# Patient Record
Sex: MALE
[De-identification: names, ages, dates, MRNs, and addresses within clinical notes are randomized per-mention and may not be internally consistent; named-entity substitution may affect disease eponyms.]

---

## 2018-05-16 ENCOUNTER — HOSPITAL ENCOUNTER (INPATIENT)
Dept: HOSPITAL 88 - FSED | Age: 73
LOS: 6 days | Discharge: HOME | DRG: 573 | End: 2018-05-22
Attending: INTERNAL MEDICINE | Admitting: INTERNAL MEDICINE
Payer: MEDICARE

## 2018-05-16 VITALS — WEIGHT: 216.38 LBS | HEIGHT: 71 IN | BODY MASS INDEX: 30.29 KG/M2

## 2018-05-16 DIAGNOSIS — I12.9: ICD-10-CM

## 2018-05-16 DIAGNOSIS — Z87.891: ICD-10-CM

## 2018-05-16 DIAGNOSIS — N17.0: ICD-10-CM

## 2018-05-16 DIAGNOSIS — E87.5: ICD-10-CM

## 2018-05-16 DIAGNOSIS — E11.40: ICD-10-CM
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
## 2018-05-16 DIAGNOSIS — L03.116: Primary | ICD-10-CM

## 2018-05-16 DIAGNOSIS — Z79.4: ICD-10-CM

## 2018-05-16 DIAGNOSIS — E11.22: ICD-10-CM

## 2018-05-16 DIAGNOSIS — N18.4: ICD-10-CM

## 2018-05-16 DIAGNOSIS — Z91.19: ICD-10-CM

## 2018-05-16 PROCEDURE — 84156 ASSAY OF PROTEIN URINE: CPT

## 2018-05-16 PROCEDURE — 87075 CULTR BACTERIA EXCEPT BLOOD: CPT

## 2018-05-16 PROCEDURE — 76882 US LMTD JT/FCL EVL NVASC XTR: CPT

## 2018-05-16 PROCEDURE — 76770 US EXAM ABDO BACK WALL COMP: CPT

## 2018-05-16 PROCEDURE — 97139 UNLISTED THERAPEUTIC PX: CPT

## 2018-05-16 PROCEDURE — 99284 EMERGENCY DEPT VISIT MOD MDM: CPT

## 2018-05-16 PROCEDURE — 82948 REAGENT STRIP/BLOOD GLUCOSE: CPT

## 2018-05-16 PROCEDURE — 81001 URINALYSIS AUTO W/SCOPE: CPT

## 2018-05-16 PROCEDURE — 36415 COLL VENOUS BLD VENIPUNCTURE: CPT

## 2018-05-16 PROCEDURE — 85025 COMPLETE CBC W/AUTO DIFF WBC: CPT

## 2018-05-16 PROCEDURE — 96361 HYDRATE IV INFUSION ADD-ON: CPT

## 2018-05-16 PROCEDURE — 87071 CULTURE AEROBIC QUANT OTHER: CPT

## 2018-05-16 PROCEDURE — 87205 SMEAR GRAM STAIN: CPT

## 2018-05-16 PROCEDURE — 84550 ASSAY OF BLOOD/URIC ACID: CPT

## 2018-05-16 PROCEDURE — 80053 COMPREHEN METABOLIC PANEL: CPT

## 2018-05-16 PROCEDURE — 82570 ASSAY OF URINE CREATININE: CPT

## 2018-05-16 PROCEDURE — 83735 ASSAY OF MAGNESIUM: CPT

## 2018-05-16 PROCEDURE — 80202 ASSAY OF VANCOMYCIN: CPT

## 2018-05-17 VITALS — SYSTOLIC BLOOD PRESSURE: 138 MMHG | DIASTOLIC BLOOD PRESSURE: 79 MMHG

## 2018-05-17 VITALS — DIASTOLIC BLOOD PRESSURE: 76 MMHG | SYSTOLIC BLOOD PRESSURE: 149 MMHG

## 2018-05-17 VITALS — DIASTOLIC BLOOD PRESSURE: 62 MMHG | SYSTOLIC BLOOD PRESSURE: 121 MMHG

## 2018-05-17 VITALS — DIASTOLIC BLOOD PRESSURE: 67 MMHG | SYSTOLIC BLOOD PRESSURE: 143 MMHG

## 2018-05-17 VITALS — DIASTOLIC BLOOD PRESSURE: 67 MMHG | SYSTOLIC BLOOD PRESSURE: 141 MMHG

## 2018-05-17 VITALS — DIASTOLIC BLOOD PRESSURE: 68 MMHG | SYSTOLIC BLOOD PRESSURE: 136 MMHG

## 2018-05-17 VITALS — SYSTOLIC BLOOD PRESSURE: 149 MMHG | DIASTOLIC BLOOD PRESSURE: 76 MMHG

## 2018-05-17 LAB
ALBUMIN SERPL-MCNC: 3.5 G/DL (ref 3.5–5)
ALBUMIN/GLOB SERPL: 1.1 {RATIO} (ref 0.8–2)
ALP SERPL-CCNC: 60 IU/L (ref 40–150)
ALT SERPL-CCNC: 14 IU/L (ref 0–55)
ANION GAP SERPL CALC-SCNC: 14.7 MMOL/L (ref 8–16)
BACTERIA URNS QL MICRO: (no result) /HPF
BASOPHILS # BLD AUTO: 0 10*3/UL (ref 0–0.1)
BASOPHILS NFR BLD AUTO: 0.8 % (ref 0–1)
BILIRUB UR QL: NEGATIVE
BUN SERPL-MCNC: 43 MG/DL (ref 7–26)
BUN/CREAT SERPL: 19 (ref 6–25)
CALCIUM SERPL-MCNC: 9.1 MG/DL (ref 8.4–10.2)
CHLORIDE SERPL-SCNC: 113 MMOL/L (ref 98–107)
CLARITY UR: CLEAR
CO2 SERPL-SCNC: 15 MMOL/L (ref 22–29)
COLOR UR: YELLOW
CREAT UR-MCNC: 52.47 MG/DL (ref 63–166)
DEPRECATED NEUTROPHILS # BLD AUTO: 3 10*3/UL (ref 2.1–6.9)
DEPRECATED RBC URNS MANUAL-ACNC: (no result) /HPF (ref 0–5)
EGFRCR SERPLBLD CKD-EPI 2021: 28 ML/MIN (ref 60–?)
EOSINOPHIL # BLD AUTO: 0.2 10*3/UL (ref 0–0.4)
EOSINOPHIL NFR BLD AUTO: 3 % (ref 0–6)
EPI CELLS URNS QL MICRO: (no result) /LPF
ERYTHROCYTE [DISTWIDTH] IN CORD BLOOD: 13.4 % (ref 11.7–14.4)
GLOBULIN PLAS-MCNC: 3.2 G/DL (ref 2.3–3.5)
GLUCOSE SERPLBLD-MCNC: 164 MG/DL (ref 74–118)
HCT VFR BLD AUTO: 33.9 % (ref 38.2–49.6)
HGB BLD-MCNC: 11.3 G/DL (ref 14–18)
KETONES UR QL STRIP.AUTO: NEGATIVE
LEUKOCYTE ESTERASE UR QL STRIP.AUTO: NEGATIVE
LYMPHOCYTES # BLD: 1.1 10*3/UL (ref 1–3.2)
LYMPHOCYTES NFR BLD AUTO: 22.8 % (ref 18–39.1)
MCH RBC QN AUTO: 29.6 PG (ref 28–32)
MCHC RBC AUTO-ENTMCNC: 33.3 G/DL (ref 31–35)
MCV RBC AUTO: 88.7 FL (ref 81–99)
MONOCYTES # BLD AUTO: 0.6 10*3/UL (ref 0.2–0.8)
MONOCYTES NFR BLD AUTO: 12.2 % (ref 4.4–11.3)
NEUTS SEG NFR BLD AUTO: 60 % (ref 38.7–80)
NITRITE UR QL STRIP.AUTO: NEGATIVE
PLATELET # BLD AUTO: 156 X10E3/UL (ref 140–360)
POTASSIUM SERPL-SCNC: 5.7 MMOL/L (ref 3.5–5.1)
PROT UR QL STRIP.AUTO: (no result)
PROT UR-MCNC: 26.9 MG/DL (ref 1–14)
RBC # BLD AUTO: 3.82 X10E6/UL (ref 4.3–5.7)
SODIUM SERPL-SCNC: 137 MMOL/L (ref 136–145)
SP GR UR STRIP: 1.01 (ref 1.01–1.02)
UROBILINOGEN UR STRIP-MCNC: 0.2 MG/DL (ref 0.2–1)
WBC #/AREA URNS HPF: (no result) /HPF (ref 0–5)

## 2018-05-17 RX ADMIN — SODIUM CHLORIDE SCH MLS/HR: 9 INJECTION, SOLUTION INTRAVENOUS at 00:00

## 2018-05-17 RX ADMIN — TRAMADOL HYDROCHLORIDE PRN MG: 50 TABLET, FILM COATED ORAL at 20:42

## 2018-05-17 RX ADMIN — GLIPIZIDE SCH MG: 5 TABLET ORAL at 16:53

## 2018-05-17 RX ADMIN — SODIUM CHLORIDE SCH MLS/HR: 9 INJECTION, SOLUTION INTRAVENOUS at 02:32

## 2018-05-17 RX ADMIN — TAZOBACTAM SODIUM AND PIPERACILLIN SODIUM SCH MLS/HR: 250; 2 INJECTION, SOLUTION INTRAVENOUS at 21:56

## 2018-05-17 RX ADMIN — TAZOBACTAM SODIUM AND PIPERACILLIN SODIUM SCH MLS/HR: 250; 2 INJECTION, SOLUTION INTRAVENOUS at 14:00

## 2018-05-17 RX ADMIN — TAZOBACTAM SODIUM AND PIPERACILLIN SODIUM SCH MLS/HR: 250; 2 INJECTION, SOLUTION INTRAVENOUS at 06:08

## 2018-05-17 RX ADMIN — DEXTROSE MONOHYDRATE SCH MLS/HR: 50 INJECTION, SOLUTION INTRAVENOUS at 15:45

## 2018-05-17 NOTE — CONSULTATION
DATE OF CONSULTATION:  May 17, 2018 



ATTENDING PHYSICIAN:  Dr. Dustin Arango.



REASON FOR CONSULTATION:  Cellulitis.



Thank you, Dr. Arango, for asking me to see this patient.  



 HISTORY OF PRESENT ILLNESS:  The patient is a 73-year-old man referred for 

cellulitis.  He presented to the emergency department yesterday with left 

leg wound, which failed outpatient treatment.  The patient fell of a ladder 

at home and sustained abrasion on the left and right pretibial areas as 

well as the right wrist.  He was evaluated as an outpatient and treated 

with Bactrim.  The right leg and wrist abrasions improved, but the left leg 

wound got worse.  He was therefore teated with clindamycin without 

improvement.  He denies fever, chills, nausea, vomiting, diarrhea, 

abdominal pain, and dysuria.  The patient claimed that outpatient x-ray 

showed no foreign body or fracture.



PAST MEDICAL HISTORY:  Diabetes mellitus type 2, hypertension, and 

hyperlipidemia.



PAST SURGICAL HISTORY:  Appendectomy, left knee arthroscopic surgery for 

torn meniscus, and left ankle open reduction and internal fixation.



ALLERGIES:  NO KNOWN DRUG ALLERGIES.  



MEDICATIONS:  Current antibiotics is Zosyn 2.25 g IV piggyback q.8 hours.  

Received vancomycin 1 g IV piggyback once and cefazolin 1 g IV piggyback 

once.  



IMMUNIZATION:  He received pneumococcal vaccination within 10 years.  Also, 

he received pneumococcal vaccination in the past.



FAMILY HISTORY:  Significant for diabetes mellitus and hypertension in the 

mother.  The father had hypertension and the sister with diabetes mellitus.



SOCIAL HISTORY:  He quit smoking cigarettes at age 27.  No alcohol or 

tobacco use.



REVIEW OF SYSTEMS:  As per history of present illness.



PHYSICAL EXAMINATION

GENERAL:  No acute distress. 

VITAL SIGNS:  T-max 98.3, pulse 74, respiratory rate 20, blood pressure 

141/67.  Weight 216 pounds.

HEENT:  Normocephalic.  There is no icterus or injection of conjunctivae.  

There is no ear or nasal discharge.  Moist oral mucosa.  No pharyngeal 

erythema or exudate.

NECK:  Supple.  No lymphadenopathy.

LUNGS:  Clear to auscultation bilaterally.  

HEART:  Normal S1 and S2.  Regular.

ABDOMEN:  Soft and nontender.

EXTREMITIES:  There is a dry scab on the right pretibial area as well as 

the right wrist.  There is no surrounding erythema or redness.  There is an 

abrasion with dry scab on the left pretibial area with surrounding erythema 

and fluctuance.  There is tenderness on palpation.  There is no edema, 

clubbing or cyanosis of the rest of the extremities.  Dorsalis pedis and 

posterior tibial pulses are palpable.

SKIN:  As per extremities.  

CNS:  Awake, alert and oriented to person, place and time.  There is 

decreased sensation on monofilament examination of the feet bilaterally.  

Also, there is decreased vibration and sensation of the feet and ankle.  

Nonfocal.



LABORATORY AND DIAGNOSTICS:  WBC 4990, hemoglobin 11.3, platelets 156,000, 

neutrophils 60, lymphs 22.8, monos 12.2, eosinophils 3, and basophils 0.8.  

BUN 43, creatinine 2.29, blood glucose 164.  AST 15, ALT 14, alk phos 60, 

and total bilirubin 0.4.





IMPRESSION 

1. Cellulitis of the left lower extremity.

2. Left pretibial wound, rule out abscess.

3. 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

4. Diabetes mellitus type 2 with peripheral neuropathy, controlled.



PLAN

1. Consult surgical service for incision and drainage.

2. Check random vancomycin level.

3. Await nephrology input.









DD:  05/17/2018 11:30

DT:  05/17/2018 13:52

Job#:  [id number]

## 2018-05-17 NOTE — CONSULTATION
DATE OF CONSULTATION:  May 17, 2018 



RENAL CONSULT 



HISTORY OF PRESENT ILLNESS:  History predominantly from chart, electronic 

records.  This is a pleasant 73-year-old white  gentleman, 

underlying history of type 2 diabetes, hypertension, who has been admitted 

with an injury to his left lower extremity.  He has had prior left ankle 

surgery and hardware placement, prior history of appendectomy.  He has a 

history of prior tobacco addiction.



FAMILY HISTORY:  Strongly positive for diabetes.  Patient also claims he 

has had kidney stones in the remote past.  Many years ago he saw a 

urologist but does not remember the radiologist's name, and no recurrence 

of kidney stone has been noted.  



Renal has been consulted because of acute kidney injury.  Serum creatinine 

2.29, bicarbonate 15, potassium 5.7.  



CURRENT MEDICATIONS:  Include cefazolin, IV normal saline at 125 mL an 

hour, piperacillin/tazobactam.  Acetaminophen p.r.n., glipizide 5 mg 

b.i.d., lisinopril 10 mg daily, ondansetron p.r.n.  Also received 1 dose of 

vancomycin.



SOCIAL HISTORY:  Ex-smoker, does not smoke or drink anymore.  .



FAMILY HISTORY:  Significant for diabetes and hypertension.



ALLERGIES:  NO APPARENT DRUG ALLERGIES.



PHYSICAL EXAMINATION:   

GENERAL:  Awake, alert, lying supine.  No apparent distress. 

VITALS:  Blood pressure 136/68, pulse rate 80, afebrile, respiratory rate 

17.

HEAD AND NECK:  Cornea clear.  Oral mucosa dry.  Neck veins flat. 

LUNGS:  Relatively clear.

HEART:  S1 and S2 audible. 

ABDOMEN:  Otherwise soft, nontender.

LOWER EXTREMITY EXAMINATION:  Left lower extremity dressing noted, not 

removed for evaluation.  No edema noted. 



IMPRESSION/PLAN:  Hyperkalemia with evidence of distal renal tubular 

acidosis.  Underlying hypertension.  Acute kidney injury, etiology unclear, 

possibly due to chronic kidney disease.  Will discontinue lisinopril.  

Discontinue current IV fluid.  Will start IV bicarbonate.  Dose with 

Kayexalate lactulose.  Place on a 2-gram-potassium restricted renal diet.  

Renal workup ordered.  Please see orders. 



 





DD:  05/17/2018 14:36

DT:  05/17/2018 14:54

Job#:  [id number]

## 2018-05-17 NOTE — DIAGNOSTIC IMAGING REPORT
PROCEDURE:US RETROPERITONEAL ( KIDNEY ).

COMPARISON:None.

INDICATIONS:GAGAN

TECHNIQUE: Grey-scale and color sonographic images of the bilateral 

kidneys and bladder where obtained in transverse and longitudinal 

planes.

 

FINDINGS:

 

RIGHT KIDNEY: 10.7 cm, cortex 1.6 cm

Cysts: None

Solid masses: None

Stones: None

Hydronephrosis: None

Echogenicity: Normal

 

LEFT KIDNEY: 13.3 cm, cortex 2.0 cm

Cysts: None

Solid masses: None

Stones: None

Hydronephrosis: None

Echogenicity: Normal

 

Bladder: Unremarkable.

Prostate: The prostate gland measures 7.5 x 2.0 x 5.0 cm (55.1 mL).

 

CONCLUSION:

Enlarged prostate gland.

 

No hydronephrosis, stones, or solid renal masses. 

 

Dictated by:  Tonny Medina M.D. on 5/17/2018 at 16:12     

Electronically approved by:  Tonny Medina M.D. on 5/17/2018 at 16:12

## 2018-05-17 NOTE — HISTORY AND PHYSICAL
He is a 73-year-old male patient of mine with a history of diabetes 

mellitus he was told because of noncompliance.  Had a fall off a ladder 2 

weeks ago, and he stabbed his left lower anterior tibia.  The patient was 

seen in the office.  The patient was started on Bactrim and Mupirocin 

cream.  The patient had no significant improvement so clindamycin was added 

and Mupirocin was changed to Povidine Iodine dressing because of the local 

infection.  That was done 2 days ago.  Apparently, the patient did not 

improve.  Then because of the pain came to the emergency room at the 

[facility].  Subsequently, the patient was found to have a high 

creatinine of 2.7.  The patient was admitted.  



PAST MEDICAL HISTORY:  Diabetes mellitus, hypertension.



PAST SURGICAL HISTORY:  Left ankle surgery and hardware in place, 

appendectomy.



REVIEW OF SYSTEMS:  Left lower leg pain.



SOCIAL HISTORY:  Former smoker.  Denies using alcohol.



FAMILY HISTORY:  Diabetes mellitus. 



MEDICATIONS:  The patient was on glimepiride, metformin, Azactam, and 

clinda.



ALLERGIES:  NO KNOWN DRUG ALLERGIES. 



PHYSICAL EXAMINATION 

GENERAL:  He is an elderly male patient lying in bed.

VITALS:  Temperature 97, pulse rate 74, blood pressure 140/70, respirations 

20.

HEENT:  Normocephalic and atraumatic.  No JVD.  No adenopathy.

LUNGS:  Bilateral equal fair air entry.  No rales.  No rhonchi.

HEART:  S1 and S2 regular.  No murmurs.  No gallops.

ABDOMEN:  Soft.  Bowel sounds are present.  

NEUROLOGICAL:  No focal neurological deficit. 



EXTREMITIES:  On the left lower extremity, the patient has leg ulcer with 

eschar, surrounding erythema and tenderness.  The area is around 5 cm.  



LABORATORY EXAMINATION:  The patient's creatinine was 3.1.  Repeat was 2.7, 

potassium 5.3.  CBC normal.  X-ray of the left tibia and fibula negative.  



IMPRESSION AND DIAGNOSES 

1.  Left lower leg cellulitis.

2.  Acute renal failure.

3.  Diabetes mellitus. 

4.  Left lower leg traumatic ulcer, which is infected.



PLAN:  The patient will be admitted with the above diagnoses.  Will give 

the patient IV fluids and stop Bactrim, clindamycin and metformin for the 

renal failure.  Give the patient IV Zosyn and dose of vancomycin.  Will 

obtain wound culture and get wound care.  Will obtain ID and nephrology 

consultation.  









DD:  05/17/2018 09:34

DT:  05/17/2018 10:11

Job#:  [id number]

## 2018-05-17 NOTE — CONSULTATION
DATE OF CONSULTATION:  May 17, 2018 



CHIEF COMPLAINT:  Left leg abrasion.  



HISTORY OF PRESENT ILLNESS:  The patient is a 73-year-old male with history 

of hypertension and diabetes who fell on a ladder 2 weeks ago with 

abrasions on the left lower extremity which has not improved despite 

treatment with antibiotics.  He has some burning pain in the area, but 

denies fever or chills.  



PAST MEDICAL HISTORY:  Significant as mentioned for diabetes and 

hypertension.



PAST SURGICAL HISTORY:  Positive for ankle of surgery hardware and 

appendectomy.  



ALLERGIES:  NO KNOWN DRUG ALLERGIES.  



SOCIAL HABITS:  The patient is a nonsmoker and non-alcohol user.



REVIEW OF SYSTEMS:  No chest pain.  No shortness of breath.   

 

PHYSICAL EXAMINATION:  

VITALS:   Signs are stable.  He is afebrile . 

GENERAL:  He is awake, alert and in no apparent distress. 

HEENT:  Sclerae anicteric. 

NECK:  Supple. 

LUNGS:  Clear. 

HEART:  Is regular rate and rhythm. 

ABDOMEN:  Soft.  Nontender. 

EXTREMITIES:  Revealed an area approximately 4 x 3 cm on the anterior 

aspect of the left lower extremity overlying the tibia with indurations and 

tenderness to palpation with a central skin necrosis.  No purulent 

drainage.  Pedal pulse present.  

White cell count is 4.9, hemoglobin 11.  Creatinine is 2.3. 



ASSESSMENT:  Left lower extremity, localized soft tissue necrosis and 

infection.



PLAN:  Recommend needle aspiration to ascertain that there is no purulent 

collection; however, the patient states he has been aspirated with nothing 

found.  Will order an ultrasound of the area.  Will follow the patient with 

you.  

 





DD:  05/17/2018 19:00

DT:  05/17/2018 19:33

Job#:  [id number]

## 2018-05-18 VITALS — DIASTOLIC BLOOD PRESSURE: 59 MMHG | SYSTOLIC BLOOD PRESSURE: 102 MMHG

## 2018-05-18 VITALS — SYSTOLIC BLOOD PRESSURE: 102 MMHG | DIASTOLIC BLOOD PRESSURE: 59 MMHG

## 2018-05-18 VITALS — SYSTOLIC BLOOD PRESSURE: 102 MMHG | DIASTOLIC BLOOD PRESSURE: 54 MMHG

## 2018-05-18 VITALS — DIASTOLIC BLOOD PRESSURE: 69 MMHG | SYSTOLIC BLOOD PRESSURE: 138 MMHG

## 2018-05-18 VITALS — SYSTOLIC BLOOD PRESSURE: 110 MMHG | DIASTOLIC BLOOD PRESSURE: 57 MMHG

## 2018-05-18 VITALS — SYSTOLIC BLOOD PRESSURE: 126 MMHG | DIASTOLIC BLOOD PRESSURE: 68 MMHG

## 2018-05-18 VITALS — SYSTOLIC BLOOD PRESSURE: 114 MMHG | DIASTOLIC BLOOD PRESSURE: 63 MMHG

## 2018-05-18 LAB
ALBUMIN SERPL-MCNC: 3.8 G/DL (ref 3.5–5)
ALBUMIN/GLOB SERPL: 1.1 {RATIO} (ref 0.8–2)
ALP SERPL-CCNC: 63 IU/L (ref 40–150)
ALT SERPL-CCNC: 15 IU/L (ref 0–55)
ANION GAP SERPL CALC-SCNC: 13.6 MMOL/L (ref 8–16)
BASOPHILS # BLD AUTO: 0.1 10*3/UL (ref 0–0.1)
BASOPHILS NFR BLD AUTO: 0.8 % (ref 0–1)
BUN SERPL-MCNC: 37 MG/DL (ref 7–26)
BUN/CREAT SERPL: 18 (ref 6–25)
CALCIUM SERPL-MCNC: 9.2 MG/DL (ref 8.4–10.2)
CHLORIDE SERPL-SCNC: 113 MMOL/L (ref 98–107)
CO2 SERPL-SCNC: 17 MMOL/L (ref 22–29)
DEPRECATED NEUTROPHILS # BLD AUTO: 4 10*3/UL (ref 2.1–6.9)
EGFRCR SERPLBLD CKD-EPI 2021: 32 ML/MIN (ref 60–?)
EOSINOPHIL # BLD AUTO: 0.2 10*3/UL (ref 0–0.4)
EOSINOPHIL NFR BLD AUTO: 3.1 % (ref 0–6)
ERYTHROCYTE [DISTWIDTH] IN CORD BLOOD: 13.5 % (ref 11.7–14.4)
GLOBULIN PLAS-MCNC: 3.6 G/DL (ref 2.3–3.5)
GLUCOSE SERPLBLD-MCNC: 107 MG/DL (ref 74–118)
HCT VFR BLD AUTO: 36.9 % (ref 38.2–49.6)
HGB BLD-MCNC: 12.2 G/DL (ref 14–18)
LYMPHOCYTES # BLD: 1.7 10*3/UL (ref 1–3.2)
LYMPHOCYTES NFR BLD AUTO: 25.3 % (ref 18–39.1)
MCH RBC QN AUTO: 29.8 PG (ref 28–32)
MCHC RBC AUTO-ENTMCNC: 33.1 G/DL (ref 31–35)
MCV RBC AUTO: 90.2 FL (ref 81–99)
MONOCYTES # BLD AUTO: 0.6 10*3/UL (ref 0.2–0.8)
MONOCYTES NFR BLD AUTO: 9.7 % (ref 4.4–11.3)
NEUTS SEG NFR BLD AUTO: 60.6 % (ref 38.7–80)
PLATELET # BLD AUTO: 194 X10E3/UL (ref 140–360)
POTASSIUM SERPL-SCNC: 4.6 MMOL/L (ref 3.5–5.1)
RBC # BLD AUTO: 4.09 X10E6/UL (ref 4.3–5.7)
SODIUM SERPL-SCNC: 139 MMOL/L (ref 136–145)

## 2018-05-18 RX ADMIN — GLIPIZIDE SCH MG: 5 TABLET ORAL at 09:18

## 2018-05-18 RX ADMIN — TAZOBACTAM SODIUM AND PIPERACILLIN SODIUM SCH MLS/HR: 250; 2 INJECTION, SOLUTION INTRAVENOUS at 06:08

## 2018-05-18 RX ADMIN — DEXTROSE MONOHYDRATE SCH MLS/HR: 50 INJECTION, SOLUTION INTRAVENOUS at 20:06

## 2018-05-18 RX ADMIN — TRAMADOL HYDROCHLORIDE PRN MG: 50 TABLET, FILM COATED ORAL at 12:03

## 2018-05-18 RX ADMIN — TAZOBACTAM SODIUM AND PIPERACILLIN SODIUM SCH MLS/HR: 250; 2 INJECTION, SOLUTION INTRAVENOUS at 21:31

## 2018-05-18 RX ADMIN — TRAMADOL HYDROCHLORIDE PRN MG: 50 TABLET, FILM COATED ORAL at 02:45

## 2018-05-18 RX ADMIN — DEXTROSE MONOHYDRATE SCH MLS/HR: 50 INJECTION, SOLUTION INTRAVENOUS at 06:43

## 2018-05-18 RX ADMIN — DEXTROSE MONOHYDRATE SCH MLS/HR: 50 INJECTION, SOLUTION INTRAVENOUS at 14:13

## 2018-05-18 RX ADMIN — GLIPIZIDE SCH MG: 5 TABLET ORAL at 17:41

## 2018-05-18 RX ADMIN — TAZOBACTAM SODIUM AND PIPERACILLIN SODIUM SCH MLS/HR: 250; 2 INJECTION, SOLUTION INTRAVENOUS at 14:13

## 2018-05-18 NOTE — DIAGNOSTIC IMAGING REPORT
PROCEDURE:EXTREMITY ULTRASOUND

 

COMPARISON:None.

 

INDICATIONS:LEFT LEG LESION/status post fall from stepladder

 

TECHNIQUE:Transverse and longitudinal images of the left lower leg in 

area of scar where performed utilizing grayscale and color Doppler 

modalities.

FINDINGS:

Examination shows a linear anechoic structure in the subcutaneous 

tissues of the left lower leg anterior to the tibia, measuring 0.5 cm 

in greatest width, which does not show any internal or peripheral 

vascularity. There is surrounding mild soft tissue edema.

Otherwise, unremarkable exam..

 

CONCLUSION:

1. Findings may represent a subcutaneous hematoma, in the setting of 

trauma. There is associated mild surrounding soft tissue edema.

 

 

 

Deangelo Martinez M.D.  

Dictated by:  Deangelo Martinez M.D. on 5/18/2018 at 19:03     

Electronically approved by:  Deangelo Martinez M.D. on 5/18/2018 at 

19:03

## 2018-05-19 VITALS — SYSTOLIC BLOOD PRESSURE: 146 MMHG | DIASTOLIC BLOOD PRESSURE: 71 MMHG

## 2018-05-19 VITALS — DIASTOLIC BLOOD PRESSURE: 70 MMHG | SYSTOLIC BLOOD PRESSURE: 109 MMHG

## 2018-05-19 VITALS — DIASTOLIC BLOOD PRESSURE: 81 MMHG | SYSTOLIC BLOOD PRESSURE: 138 MMHG

## 2018-05-19 VITALS — SYSTOLIC BLOOD PRESSURE: 138 MMHG | DIASTOLIC BLOOD PRESSURE: 67 MMHG

## 2018-05-19 VITALS — DIASTOLIC BLOOD PRESSURE: 73 MMHG | SYSTOLIC BLOOD PRESSURE: 155 MMHG

## 2018-05-19 VITALS — DIASTOLIC BLOOD PRESSURE: 71 MMHG | SYSTOLIC BLOOD PRESSURE: 146 MMHG

## 2018-05-19 VITALS — SYSTOLIC BLOOD PRESSURE: 143 MMHG | DIASTOLIC BLOOD PRESSURE: 72 MMHG

## 2018-05-19 LAB
ALBUMIN SERPL-MCNC: 3.5 G/DL (ref 3.5–5)
ALBUMIN/GLOB SERPL: 1.1 {RATIO} (ref 0.8–2)
ALP SERPL-CCNC: 57 IU/L (ref 40–150)
ALT SERPL-CCNC: 13 IU/L (ref 0–55)
ANION GAP SERPL CALC-SCNC: 11.7 MMOL/L (ref 8–16)
BASOPHILS # BLD AUTO: 0 10*3/UL (ref 0–0.1)
BASOPHILS NFR BLD AUTO: 0.7 % (ref 0–1)
BUN SERPL-MCNC: 25 MG/DL (ref 7–26)
BUN/CREAT SERPL: 17 (ref 6–25)
CALCIUM SERPL-MCNC: 9.1 MG/DL (ref 8.4–10.2)
CHLORIDE SERPL-SCNC: 106 MMOL/L (ref 98–107)
CO2 SERPL-SCNC: 28 MMOL/L (ref 22–29)
DEPRECATED NEUTROPHILS # BLD AUTO: 3.5 10*3/UL (ref 2.1–6.9)
EGFRCR SERPLBLD CKD-EPI 2021: 48 ML/MIN (ref 60–?)
EOSINOPHIL # BLD AUTO: 0.2 10*3/UL (ref 0–0.4)
EOSINOPHIL NFR BLD AUTO: 2.8 % (ref 0–6)
ERYTHROCYTE [DISTWIDTH] IN CORD BLOOD: 13 % (ref 11.7–14.4)
GLOBULIN PLAS-MCNC: 3.2 G/DL (ref 2.3–3.5)
GLUCOSE SERPLBLD-MCNC: 117 MG/DL (ref 74–118)
HCT VFR BLD AUTO: 33.7 % (ref 38.2–49.6)
HGB BLD-MCNC: 11.2 G/DL (ref 14–18)
LYMPHOCYTES # BLD: 1.1 10*3/UL (ref 1–3.2)
LYMPHOCYTES NFR BLD AUTO: 20.6 % (ref 18–39.1)
MCH RBC QN AUTO: 28.8 PG (ref 28–32)
MCHC RBC AUTO-ENTMCNC: 33.2 G/DL (ref 31–35)
MCV RBC AUTO: 86.6 FL (ref 81–99)
MONOCYTES # BLD AUTO: 0.6 10*3/UL (ref 0.2–0.8)
MONOCYTES NFR BLD AUTO: 11 % (ref 4.4–11.3)
NEUTS SEG NFR BLD AUTO: 64.7 % (ref 38.7–80)
PLATELET # BLD AUTO: 171 X10E3/UL (ref 140–360)
POTASSIUM SERPL-SCNC: 4.7 MMOL/L (ref 3.5–5.1)
RBC # BLD AUTO: 3.89 X10E6/UL (ref 4.3–5.7)
SODIUM SERPL-SCNC: 141 MMOL/L (ref 136–145)

## 2018-05-19 PROCEDURE — 0YBB0ZZ EXCISION OF LEFT LOWER EXTREMITY, OPEN APPROACH: ICD-10-PCS | Performed by: SURGERY

## 2018-05-19 RX ADMIN — TAZOBACTAM SODIUM AND PIPERACILLIN SODIUM SCH MLS/HR: 250; 2 INJECTION, SOLUTION INTRAVENOUS at 13:07

## 2018-05-19 RX ADMIN — TAZOBACTAM SODIUM AND PIPERACILLIN SODIUM SCH MLS/HR: 250; 2 INJECTION, SOLUTION INTRAVENOUS at 06:10

## 2018-05-19 RX ADMIN — DEXTROSE AND SODIUM CHLORIDE SCH MLS/HR: 5; 450 INJECTION, SOLUTION INTRAVENOUS at 08:56

## 2018-05-19 RX ADMIN — DEXTROSE AND SODIUM CHLORIDE SCH MLS/HR: 5; 450 INJECTION, SOLUTION INTRAVENOUS at 21:35

## 2018-05-19 RX ADMIN — GLIPIZIDE SCH MG: 5 TABLET ORAL at 08:15

## 2018-05-19 RX ADMIN — GLIPIZIDE SCH MG: 5 TABLET ORAL at 19:17

## 2018-05-19 RX ADMIN — TRAMADOL HYDROCHLORIDE PRN MG: 50 TABLET, FILM COATED ORAL at 14:03

## 2018-05-19 RX ADMIN — TAZOBACTAM SODIUM AND PIPERACILLIN SODIUM SCH MLS/HR: 250; 2 INJECTION, SOLUTION INTRAVENOUS at 21:42

## 2018-05-19 NOTE — OPERATIVE REPORT
DATE OF PROCEDURE:  May 19, 2018 



PREOPERATIVE DIAGNOSIS:  Left leg hematoma.



POSTOPERATIVE DIAGNOSIS:  Left leg hematoma.



OPERATIVE PROCEDURE:  Debridement and drainage of left leg subcutaneous 

hematoma.



ANESTHESIA:  General.



INDICATIONS:  The patient is a 73-year-old male with a 2-week history of 

bruise and skin necrosis on the left anterior leg after falling from a 

ladder.  The patient had an ultrasound, which shows subcutaneous hematoma 

on the left anterior leg, and he has consented for debridement and drainage 

of the hematoma under anesthesia with all attendant risks discussed.



PROCEDURE FINDINGS:  Hematoma in the subcutaneous tissue anterior left 

lower extremity.



DESCRIPTION OF PROCEDURE:  The patient was brought to the OR and given 

general anesthesia in the supine position.  The anterior left lower 

extremity is prepped with alcohol and draped in a sterile fashion.  Patient 

has an island of skin necrosis approximately 4 x 2 cm, which was excised 

using sharp dissection removing full-thickness of skin.  The subcutaneous 

tissue was opened with Metzenbaum scissors entering a large hematoma with 

blood clots.  Approximately, 5 mL removed.  All the blood clot is debrided 

with manual debridement, as well as irrigation with saline.  Swab culture 

was done for culture and sensitivity.  The wound is checked for bleeding 

points.  Cautery used for hemostasis.  The wound was packed with Iodoform 

gauze.  

Dressing applied with 4 x 4 and Kerlix roll.  The patient was then awakened 

from anesthesia and transported to the recovery room.  Estimated blood loss 

5 mL total with clots.













DD:  05/19/2018 10:25

DT:  05/19/2018 14:06

Job#:  [id number]

## 2018-05-20 VITALS — SYSTOLIC BLOOD PRESSURE: 143 MMHG | DIASTOLIC BLOOD PRESSURE: 65 MMHG

## 2018-05-20 VITALS — DIASTOLIC BLOOD PRESSURE: 58 MMHG | SYSTOLIC BLOOD PRESSURE: 120 MMHG

## 2018-05-20 VITALS — DIASTOLIC BLOOD PRESSURE: 67 MMHG | SYSTOLIC BLOOD PRESSURE: 147 MMHG

## 2018-05-20 VITALS — DIASTOLIC BLOOD PRESSURE: 72 MMHG | SYSTOLIC BLOOD PRESSURE: 151 MMHG

## 2018-05-20 VITALS — DIASTOLIC BLOOD PRESSURE: 72 MMHG | SYSTOLIC BLOOD PRESSURE: 135 MMHG

## 2018-05-20 VITALS — SYSTOLIC BLOOD PRESSURE: 141 MMHG | DIASTOLIC BLOOD PRESSURE: 71 MMHG

## 2018-05-20 LAB
ALBUMIN SERPL-MCNC: 3.4 G/DL (ref 3.5–5)
ALBUMIN/GLOB SERPL: 1.1 {RATIO} (ref 0.8–2)
ALP SERPL-CCNC: 59 IU/L (ref 40–150)
ALT SERPL-CCNC: 12 IU/L (ref 0–55)
ANION GAP SERPL CALC-SCNC: 12 MMOL/L (ref 8–16)
BASOPHILS # BLD AUTO: 0 10*3/UL (ref 0–0.1)
BASOPHILS NFR BLD AUTO: 0.5 % (ref 0–1)
BUN SERPL-MCNC: 17 MG/DL (ref 7–26)
BUN/CREAT SERPL: 13 (ref 6–25)
CALCIUM SERPL-MCNC: 8.7 MG/DL (ref 8.4–10.2)
CHLORIDE SERPL-SCNC: 107 MMOL/L (ref 98–107)
CO2 SERPL-SCNC: 25 MMOL/L (ref 22–29)
DEPRECATED NEUTROPHILS # BLD AUTO: 3.8 10*3/UL (ref 2.1–6.9)
EGFRCR SERPLBLD CKD-EPI 2021: 55 ML/MIN (ref 60–?)
EOSINOPHIL # BLD AUTO: 0.1 10*3/UL (ref 0–0.4)
EOSINOPHIL NFR BLD AUTO: 1.7 % (ref 0–6)
ERYTHROCYTE [DISTWIDTH] IN CORD BLOOD: 12.9 % (ref 11.7–14.4)
GLOBULIN PLAS-MCNC: 3.1 G/DL (ref 2.3–3.5)
GLUCOSE SERPLBLD-MCNC: 113 MG/DL (ref 74–118)
HCT VFR BLD AUTO: 31.3 % (ref 38.2–49.6)
HGB BLD-MCNC: 10.5 G/DL (ref 14–18)
LYMPHOCYTES # BLD: 1.7 10*3/UL (ref 1–3.2)
LYMPHOCYTES NFR BLD AUTO: 26.9 % (ref 18–39.1)
MCH RBC QN AUTO: 29.2 PG (ref 28–32)
MCHC RBC AUTO-ENTMCNC: 33.5 G/DL (ref 31–35)
MCV RBC AUTO: 86.9 FL (ref 81–99)
MONOCYTES # BLD AUTO: 0.7 10*3/UL (ref 0.2–0.8)
MONOCYTES NFR BLD AUTO: 10.6 % (ref 4.4–11.3)
NEUTS SEG NFR BLD AUTO: 60 % (ref 38.7–80)
PLATELET # BLD AUTO: 186 X10E3/UL (ref 140–360)
POTASSIUM SERPL-SCNC: 4 MMOL/L (ref 3.5–5.1)
RBC # BLD AUTO: 3.6 X10E6/UL (ref 4.3–5.7)
SODIUM SERPL-SCNC: 140 MMOL/L (ref 136–145)

## 2018-05-20 RX ADMIN — TRAMADOL HYDROCHLORIDE PRN MG: 50 TABLET, FILM COATED ORAL at 00:00

## 2018-05-20 RX ADMIN — TAZOBACTAM SODIUM AND PIPERACILLIN SODIUM SCH MLS/HR: 250; 2 INJECTION, SOLUTION INTRAVENOUS at 05:08

## 2018-05-20 RX ADMIN — TAZOBACTAM SODIUM AND PIPERACILLIN SODIUM SCH MLS/HR: 250; 2 INJECTION, SOLUTION INTRAVENOUS at 14:10

## 2018-05-20 RX ADMIN — TAZOBACTAM SODIUM AND PIPERACILLIN SODIUM SCH MLS/HR: 250; 2 INJECTION, SOLUTION INTRAVENOUS at 21:45

## 2018-05-20 RX ADMIN — GLIPIZIDE SCH MG: 5 TABLET ORAL at 16:29

## 2018-05-20 RX ADMIN — HYDROCODONE BITARTRATE AND ACETAMINOPHEN PRN EA: 7.5; 325 TABLET ORAL at 16:29

## 2018-05-20 RX ADMIN — HYDROCODONE BITARTRATE AND ACETAMINOPHEN PRN EA: 7.5; 325 TABLET ORAL at 22:30

## 2018-05-20 RX ADMIN — GLIPIZIDE SCH MG: 5 TABLET ORAL at 09:04

## 2018-05-20 RX ADMIN — TRAMADOL HYDROCHLORIDE PRN MG: 50 TABLET, FILM COATED ORAL at 13:30

## 2018-05-21 VITALS — SYSTOLIC BLOOD PRESSURE: 139 MMHG | DIASTOLIC BLOOD PRESSURE: 77 MMHG

## 2018-05-21 VITALS — DIASTOLIC BLOOD PRESSURE: 68 MMHG | SYSTOLIC BLOOD PRESSURE: 124 MMHG

## 2018-05-21 VITALS — SYSTOLIC BLOOD PRESSURE: 143 MMHG | DIASTOLIC BLOOD PRESSURE: 67 MMHG

## 2018-05-21 VITALS — SYSTOLIC BLOOD PRESSURE: 165 MMHG | DIASTOLIC BLOOD PRESSURE: 82 MMHG

## 2018-05-21 VITALS — SYSTOLIC BLOOD PRESSURE: 137 MMHG | DIASTOLIC BLOOD PRESSURE: 77 MMHG

## 2018-05-21 RX ADMIN — TAZOBACTAM SODIUM AND PIPERACILLIN SODIUM SCH MLS/HR: 250; 2 INJECTION, SOLUTION INTRAVENOUS at 21:30

## 2018-05-21 RX ADMIN — TAZOBACTAM SODIUM AND PIPERACILLIN SODIUM SCH MLS/HR: 250; 2 INJECTION, SOLUTION INTRAVENOUS at 14:00

## 2018-05-21 RX ADMIN — TAZOBACTAM SODIUM AND PIPERACILLIN SODIUM SCH MLS/HR: 250; 2 INJECTION, SOLUTION INTRAVENOUS at 06:01

## 2018-05-21 RX ADMIN — GLIPIZIDE SCH MG: 5 TABLET ORAL at 16:30

## 2018-05-21 RX ADMIN — HYDROCODONE BITARTRATE AND ACETAMINOPHEN PRN EA: 7.5; 325 TABLET ORAL at 13:39

## 2018-05-21 RX ADMIN — GLIPIZIDE SCH MG: 5 TABLET ORAL at 09:00

## 2018-05-22 VITALS — SYSTOLIC BLOOD PRESSURE: 187 MMHG | DIASTOLIC BLOOD PRESSURE: 97 MMHG

## 2018-05-22 LAB
ALBUMIN SERPL-MCNC: 3.5 G/DL (ref 3.5–5)
ALBUMIN/GLOB SERPL: 1.1 {RATIO} (ref 0.8–2)
ALP SERPL-CCNC: 62 IU/L (ref 40–150)
ALT SERPL-CCNC: 12 IU/L (ref 0–55)
ANION GAP SERPL CALC-SCNC: 12.2 MMOL/L (ref 8–16)
BUN SERPL-MCNC: 17 MG/DL (ref 7–26)
BUN/CREAT SERPL: 13 (ref 6–25)
CALCIUM SERPL-MCNC: 9.4 MG/DL (ref 8.4–10.2)
CHLORIDE SERPL-SCNC: 110 MMOL/L (ref 98–107)
CO2 SERPL-SCNC: 22 MMOL/L (ref 22–29)
EGFRCR SERPLBLD CKD-EPI 2021: 53 ML/MIN (ref 60–?)
GLOBULIN PLAS-MCNC: 3.1 G/DL (ref 2.3–3.5)
GLUCOSE SERPLBLD-MCNC: 104 MG/DL (ref 74–118)
POTASSIUM SERPL-SCNC: 4.2 MMOL/L (ref 3.5–5.1)
SODIUM SERPL-SCNC: 140 MMOL/L (ref 136–145)

## 2018-05-22 RX ADMIN — TAZOBACTAM SODIUM AND PIPERACILLIN SODIUM SCH MLS/HR: 250; 2 INJECTION, SOLUTION INTRAVENOUS at 06:10

## 2018-05-22 RX ADMIN — GLIPIZIDE SCH MG: 5 TABLET ORAL at 08:22

## 2018-05-22 NOTE — DISCHARGE SUMMARY
This 73-year-old male patient of mine presented with complaint of left leg 

wound which was not healing as an outpatient.  The patient also had renal 

failure.  



ADMITTING IMPRESSION AND DIAGNOSES

1. Left lower leg cellulitis from injury.

2. Acute renal failure.

3. Diabetes mellitus.

4. Hypertension.

5. Hyperlipidemia.



HOSPITAL COURSE SUMMARY:  The patient was admitted with the above 

diagnoses.  The patient's creatinine had jumped up to 3.1.  With IV fluids, 

changing antibiotics and medications, that improved.  The patient was 

treated with IV antibiotics, vancomycin and Zosyn.  ID and surgery consults 

and nephrology consult were done.  The patient had surgical debridement of 

the wound done by Dr. Cardona, and postop wound care was given.  The patient 

had improved significantly with the above treatment.  His creatinine had 

come down to 1.32.  Now, upon stabilization, the patient will be discharged 

home on minocycline 100 mg twice a day for 10 days and Iodosorb gel for the 

wound care.  The patient is able to resume his metformin and lisinopril and 

continue with the glipizide for his diabetes.  The patient will be followed 

up as an outpatient. We will repeat his lab exam as outpatient.  



 

 _________________________________

JOHNNA BRUNO MD



DD:  05/22/2018 10:04

DT:  05/22/2018 10:31

Job#:  [id number]

## 2018-05-26 ENCOUNTER — HOSPITAL ENCOUNTER (EMERGENCY)
Dept: HOSPITAL 88 - ER | Age: 73
Discharge: HOME | End: 2018-05-26
Payer: MEDICARE

## 2018-05-26 VITALS — DIASTOLIC BLOOD PRESSURE: 77 MMHG | SYSTOLIC BLOOD PRESSURE: 159 MMHG

## 2018-05-26 VITALS — BODY MASS INDEX: 30.24 KG/M2 | WEIGHT: 216 LBS | HEIGHT: 71 IN

## 2018-05-26 DIAGNOSIS — R21: Primary | ICD-10-CM

## 2018-05-26 DIAGNOSIS — L53.9: ICD-10-CM

## 2018-05-26 DIAGNOSIS — I10: ICD-10-CM

## 2018-05-26 DIAGNOSIS — E11.9: ICD-10-CM

## 2018-05-26 DIAGNOSIS — N18.9: ICD-10-CM

## 2018-05-26 PROCEDURE — 99282 EMERGENCY DEPT VISIT SF MDM: CPT
